# Patient Record
Sex: FEMALE | Race: WHITE | Employment: FULL TIME | URBAN - METROPOLITAN AREA
[De-identification: names, ages, dates, MRNs, and addresses within clinical notes are randomized per-mention and may not be internally consistent; named-entity substitution may affect disease eponyms.]

---

## 2023-02-15 ENCOUNTER — LAB ENCOUNTER (OUTPATIENT)
Dept: LAB | Age: 48
End: 2023-02-15
Attending: NURSE PRACTITIONER
Payer: COMMERCIAL

## 2023-02-15 DIAGNOSIS — E55.9 VITAMIN D DEFICIENCY: ICD-10-CM

## 2023-02-15 LAB — VIT D+METAB SERPL-MCNC: 19.7 NG/ML (ref 30–100)

## 2023-02-15 PROCEDURE — 82306 VITAMIN D 25 HYDROXY: CPT | Performed by: NURSE PRACTITIONER

## 2023-02-15 PROCEDURE — 80053 COMPREHEN METABOLIC PANEL: CPT | Performed by: NURSE PRACTITIONER

## 2023-02-15 PROCEDURE — 85025 COMPLETE CBC W/AUTO DIFF WBC: CPT | Performed by: NURSE PRACTITIONER

## 2023-02-15 PROCEDURE — 83735 ASSAY OF MAGNESIUM: CPT | Performed by: NURSE PRACTITIONER

## 2023-02-16 DIAGNOSIS — E55.9 VITAMIN D DEFICIENCY: Primary | ICD-10-CM

## 2023-02-16 RX ORDER — ERGOCALCIFEROL 1.25 MG/1
50000 CAPSULE ORAL
Qty: 12 CAPSULE | Refills: 0 | Status: SHIPPED | OUTPATIENT
Start: 2023-02-16

## 2023-10-18 ENCOUNTER — LAB ENCOUNTER (OUTPATIENT)
Dept: LAB | Age: 48
End: 2023-10-18
Attending: NURSE PRACTITIONER
Payer: COMMERCIAL

## 2023-10-18 ENCOUNTER — OFFICE VISIT (OUTPATIENT)
Dept: INTERNAL MEDICINE CLINIC | Facility: CLINIC | Age: 48
End: 2023-10-18
Payer: COMMERCIAL

## 2023-10-18 VITALS
BODY MASS INDEX: 20.44 KG/M2 | TEMPERATURE: 98 F | DIASTOLIC BLOOD PRESSURE: 70 MMHG | HEART RATE: 78 BPM | WEIGHT: 138 LBS | HEIGHT: 69 IN | OXYGEN SATURATION: 98 % | SYSTOLIC BLOOD PRESSURE: 110 MMHG | RESPIRATION RATE: 16 BRPM

## 2023-10-18 DIAGNOSIS — Z00.00 ANNUAL PHYSICAL EXAM: Primary | ICD-10-CM

## 2023-10-18 DIAGNOSIS — Z80.3 FH: BREAST CANCER IN FIRST DEGREE RELATIVE WHEN <50 YEARS OLD: ICD-10-CM

## 2023-10-18 DIAGNOSIS — Z12.11 COLON CANCER SCREENING: ICD-10-CM

## 2023-10-18 DIAGNOSIS — Z23 NEED FOR INFLUENZA VACCINATION: ICD-10-CM

## 2023-10-18 DIAGNOSIS — R92.30 DENSE BREAST TISSUE: ICD-10-CM

## 2023-10-18 DIAGNOSIS — E55.9 VITAMIN D DEFICIENCY: ICD-10-CM

## 2023-10-18 DIAGNOSIS — Z00.00 ANNUAL PHYSICAL EXAM: ICD-10-CM

## 2023-10-18 LAB
ALBUMIN SERPL-MCNC: 4.1 G/DL (ref 3.4–5)
ALBUMIN/GLOB SERPL: 1.1 {RATIO} (ref 1–2)
ALP LIVER SERPL-CCNC: 64 U/L
ALT SERPL-CCNC: 16 U/L
ANION GAP SERPL CALC-SCNC: 6 MMOL/L (ref 0–18)
AST SERPL-CCNC: 11 U/L (ref 15–37)
BASOPHILS # BLD AUTO: 0.1 X10(3) UL (ref 0–0.2)
BASOPHILS NFR BLD AUTO: 1.4 %
BILIRUB SERPL-MCNC: 0.8 MG/DL (ref 0.1–2)
BILIRUB UR QL STRIP.AUTO: NEGATIVE
BUN BLD-MCNC: 12 MG/DL (ref 7–18)
CALCIUM BLD-MCNC: 8.9 MG/DL (ref 8.5–10.1)
CHLORIDE SERPL-SCNC: 109 MMOL/L (ref 98–112)
CHOLEST SERPL-MCNC: 171 MG/DL (ref ?–200)
CLARITY UR REFRACT.AUTO: CLEAR
CO2 SERPL-SCNC: 25 MMOL/L (ref 21–32)
COLOR UR AUTO: YELLOW
CREAT BLD-MCNC: 0.91 MG/DL
EGFRCR SERPLBLD CKD-EPI 2021: 79 ML/MIN/1.73M2 (ref 60–?)
EOSINOPHIL # BLD AUTO: 0.45 X10(3) UL (ref 0–0.7)
EOSINOPHIL NFR BLD AUTO: 6.3 %
ERYTHROCYTE [DISTWIDTH] IN BLOOD BY AUTOMATED COUNT: 12.3 %
FASTING PATIENT LIPID ANSWER: YES
FASTING STATUS PATIENT QL REPORTED: YES
GLOBULIN PLAS-MCNC: 3.7 G/DL (ref 2.8–4.4)
GLUCOSE BLD-MCNC: 91 MG/DL (ref 70–99)
GLUCOSE UR STRIP.AUTO-MCNC: NORMAL MG/DL
HCT VFR BLD AUTO: 42.9 %
HDLC SERPL-MCNC: 83 MG/DL (ref 40–59)
HGB BLD-MCNC: 14.2 G/DL
IMM GRANULOCYTES # BLD AUTO: 0.01 X10(3) UL (ref 0–1)
IMM GRANULOCYTES NFR BLD: 0.1 %
KETONES UR STRIP.AUTO-MCNC: NEGATIVE MG/DL
LDLC SERPL CALC-MCNC: 74 MG/DL (ref ?–100)
LEUKOCYTE ESTERASE UR QL STRIP.AUTO: NEGATIVE
LYMPHOCYTES # BLD AUTO: 1.81 X10(3) UL (ref 1–4)
LYMPHOCYTES NFR BLD AUTO: 25.2 %
MCH RBC QN AUTO: 31.8 PG (ref 26–34)
MCHC RBC AUTO-ENTMCNC: 33.1 G/DL (ref 31–37)
MCV RBC AUTO: 96.2 FL
MONOCYTES # BLD AUTO: 0.51 X10(3) UL (ref 0.1–1)
MONOCYTES NFR BLD AUTO: 7.1 %
NEUTROPHILS # BLD AUTO: 4.3 X10 (3) UL (ref 1.5–7.7)
NEUTROPHILS # BLD AUTO: 4.3 X10(3) UL (ref 1.5–7.7)
NEUTROPHILS NFR BLD AUTO: 59.9 %
NITRITE UR QL STRIP.AUTO: NEGATIVE
NONHDLC SERPL-MCNC: 88 MG/DL (ref ?–130)
OSMOLALITY SERPL CALC.SUM OF ELEC: 289 MOSM/KG (ref 275–295)
PH UR STRIP.AUTO: 5.5 [PH] (ref 5–8)
PLATELET # BLD AUTO: 218 10(3)UL (ref 150–450)
POTASSIUM SERPL-SCNC: 4 MMOL/L (ref 3.5–5.1)
PROT SERPL-MCNC: 7.8 G/DL (ref 6.4–8.2)
PROT UR STRIP.AUTO-MCNC: 20 MG/DL
RBC # BLD AUTO: 4.46 X10(6)UL
RBC UR QL AUTO: NEGATIVE
SODIUM SERPL-SCNC: 140 MMOL/L (ref 136–145)
SP GR UR STRIP.AUTO: 1.03 (ref 1–1.03)
TRIGL SERPL-MCNC: 76 MG/DL (ref 30–149)
TSI SER-ACNC: 1.44 MIU/ML (ref 0.36–3.74)
UROBILINOGEN UR STRIP.AUTO-MCNC: NORMAL MG/DL
VIT D+METAB SERPL-MCNC: 38.3 NG/ML (ref 30–100)
VLDLC SERPL CALC-MCNC: 12 MG/DL (ref 0–30)
WBC # BLD AUTO: 7.2 X10(3) UL (ref 4–11)

## 2023-10-18 PROCEDURE — 80050 GENERAL HEALTH PANEL: CPT | Performed by: NURSE PRACTITIONER

## 2023-10-18 PROCEDURE — 99396 PREV VISIT EST AGE 40-64: CPT | Performed by: NURSE PRACTITIONER

## 2023-10-18 PROCEDURE — 3078F DIAST BP <80 MM HG: CPT | Performed by: NURSE PRACTITIONER

## 2023-10-18 PROCEDURE — 90686 IIV4 VACC NO PRSV 0.5 ML IM: CPT | Performed by: NURSE PRACTITIONER

## 2023-10-18 PROCEDURE — 82306 VITAMIN D 25 HYDROXY: CPT | Performed by: NURSE PRACTITIONER

## 2023-10-18 PROCEDURE — 81001 URINALYSIS AUTO W/SCOPE: CPT | Performed by: NURSE PRACTITIONER

## 2023-10-18 PROCEDURE — 80061 LIPID PANEL: CPT | Performed by: NURSE PRACTITIONER

## 2023-10-18 PROCEDURE — 90471 IMMUNIZATION ADMIN: CPT | Performed by: NURSE PRACTITIONER

## 2023-10-18 PROCEDURE — 3074F SYST BP LT 130 MM HG: CPT | Performed by: NURSE PRACTITIONER

## 2023-10-18 PROCEDURE — 3008F BODY MASS INDEX DOCD: CPT | Performed by: NURSE PRACTITIONER

## 2023-11-08 ENCOUNTER — GENETICS ENCOUNTER (OUTPATIENT)
Dept: GENETICS | Facility: HOSPITAL | Age: 48
End: 2023-11-08
Attending: GENETIC COUNSELOR, MS
Payer: COMMERCIAL

## 2023-11-08 ENCOUNTER — NURSE ONLY (OUTPATIENT)
Dept: HEMATOLOGY/ONCOLOGY | Facility: HOSPITAL | Age: 48
End: 2023-11-08
Attending: GENETIC COUNSELOR, MS
Payer: COMMERCIAL

## 2023-11-08 DIAGNOSIS — Z80.0 FAMILY HISTORY OF MALIGNANT NEOPLASM OF GASTROINTESTINAL TRACT: ICD-10-CM

## 2023-11-08 DIAGNOSIS — Z80.41 FAMILY HISTORY OF OVARIAN CANCER: ICD-10-CM

## 2023-11-08 DIAGNOSIS — Z80.9 FAMILY HISTORY OF CANCER: ICD-10-CM

## 2023-11-08 DIAGNOSIS — Z80.3 FAMILY HISTORY OF BREAST CANCER: Primary | ICD-10-CM

## 2023-11-08 PROCEDURE — 96040 HC GENETIC COUNSELING EA 30 MIN: CPT | Performed by: GENETIC COUNSELOR, MS

## 2023-11-08 PROCEDURE — 36415 COLL VENOUS BLD VENIPUNCTURE: CPT

## 2023-11-28 ENCOUNTER — GENETICS ENCOUNTER (OUTPATIENT)
Dept: HEMATOLOGY/ONCOLOGY | Facility: HOSPITAL | Age: 48
End: 2023-11-28

## 2023-11-28 DIAGNOSIS — Z13.71 BRCA GENE MUTATION NEGATIVE IN FEMALE: Primary | ICD-10-CM

## 2023-11-28 NOTE — PROGRESS NOTES
Patient Name: Jennifer Catalan  YOB: 1975    Referring Provider: JUVENAL Bauman  Additional Provider(s): Dayana Gonsales MD     Reason for Referral:  Ms. Tunde Duke had genetic testing performed on 11/8/2023 because of a family history of breast and other cancers. Genetic Testing Result:  Negative. No pathogenic variant was found in the following 70 genes on the [employer] BRCA1/2 panel and multi-cancer + RNA panel: AIP, ALK, APC*, AKIRA*, AXIN2, BAP1, BARD1, BLM, BMPR1A, BRCA1, BRCA2, BRIP1, CDC73, CDH1, CDK4, CDKN1B, CDKN2A (p14ARF), CDKN2A (b93WPA1g), CHEK2, CTNNA1, DICER1*, EGFR, EPCAM*, FH*, FLCN, GREM1*, HOXB13, KIT, LZTR1, MAX*, MBD4, MEN1*, MET*, MITF, MLH1*, MSH2*, MSH3*, MSH6*, MUTYH, NF1*, NF2, NTHL1, PALB2, PDGFRA, PMS2*, POLD1*, POLE, POT1, JZQJD7X, PTCH1, PTEN*, RAD51C, RAD51D, RB1*, RET, SDHA*, SDHAF2, SDHB, SDHC*, SDHD, SMAD4, SMARCA4, SMARCB1, SMARCE1, STK11, SUFU, DLRV522, TP53, TSC1*, TSC2, VHL. Please refer to the report from [facility] for additional testing information. These results were discussed with Ms. Willis via telephone on 11/28/2023. Summary and Plan:   These results indicate it is unlikely that Ms. Willis has a pathogenic variant (harmful genetic mutation) in any of the genes listed above. No pathogenic variants associated with hereditary cancer syndromes were identified. The etiology Ms. Willis's family history of cancer remains genetically unexplained. Medical management and surveillance for Ms. Willis and other family members should be based on their personal and family history. All medical management decisions should be made with a physician. The limitations of the testing were discussed with Ms. Willis including the chance that a pathogenic variant in a gene other than those included in this analysis might be the cause of cancer in Ms. Willis or in relatives.      Presently, there are multiple models available to calculate risks for an individual to develop breast cancer over their lifetime. Each model takes into consideration different factors. Based on the personal health and pedigree information provided at this point in time, Ms. Willis would be assessed a lifetime risk of 34.6% (Tyrer-Cuzick HOLLIE V.8.b) to develop breast cancer (compared to a 10% lifetime risk for the average woman). [employer] and the [address] recommends high-risk individuals with a lifetime breast cancer risk >20% receive annual screening breast MRI in addition to annual screening mammography, regardless of breast density. If the patient is unable to receive MRI, then supplemental screening with ultrasound or MBI in addition to routine mammography may be appropriate. It is important to note that the estimation of breast cancer risk changes over time. It may increase or decrease over time based on age and changes in the personal and/or family history. I encouraged Ms. Willis to share the genetic test results with her relatives so that they may discuss the implications of this information with their health providers. Ms. Sherley Hahn is also encouraged to contact me on an annual basis to learn if there have been any updates in genetic testing that would apply or if there are changes in the personal and/or family history. Please do not hesitate to contact my office if you have any questions or concerns, [phone].      Jose Maria Woodruff, MS, CGC

## 2024-04-12 PROBLEM — Z80.0 FAMILY HISTORY OF COLON CANCER: Status: ACTIVE | Noted: 2024-04-12

## 2024-04-12 PROBLEM — Z12.11 SPECIAL SCREENING FOR MALIGNANT NEOPLASM OF COLON: Status: ACTIVE | Noted: 2024-04-12

## 2024-04-12 PROBLEM — Z83.719 FAMILY HISTORY OF COLONIC POLYPS: Status: ACTIVE | Noted: 2024-04-12

## 2024-04-12 PROBLEM — D12.4 BENIGN NEOPLASM OF DESCENDING COLON: Status: ACTIVE | Noted: 2024-04-12

## 2024-10-23 ENCOUNTER — OFFICE VISIT (OUTPATIENT)
Dept: INTERNAL MEDICINE CLINIC | Facility: CLINIC | Age: 49
End: 2024-10-23
Payer: COMMERCIAL

## 2024-10-23 ENCOUNTER — LAB ENCOUNTER (OUTPATIENT)
Dept: LAB | Age: 49
End: 2024-10-23
Attending: NURSE PRACTITIONER
Payer: COMMERCIAL

## 2024-10-23 VITALS
BODY MASS INDEX: 20.73 KG/M2 | DIASTOLIC BLOOD PRESSURE: 68 MMHG | TEMPERATURE: 98 F | WEIGHT: 140 LBS | HEART RATE: 78 BPM | SYSTOLIC BLOOD PRESSURE: 116 MMHG | OXYGEN SATURATION: 98 % | HEIGHT: 69 IN | RESPIRATION RATE: 14 BRPM

## 2024-10-23 DIAGNOSIS — Z00.00 ANNUAL PHYSICAL EXAM: ICD-10-CM

## 2024-10-23 DIAGNOSIS — R92.30 DENSE BREAST TISSUE: ICD-10-CM

## 2024-10-23 DIAGNOSIS — Z23 NEED FOR DIPHTHERIA-TETANUS-PERTUSSIS (TDAP) VACCINE: ICD-10-CM

## 2024-10-23 DIAGNOSIS — E55.9 VITAMIN D DEFICIENCY: ICD-10-CM

## 2024-10-23 DIAGNOSIS — Z91.89 AT HIGH RISK FOR BREAST CANCER: ICD-10-CM

## 2024-10-23 DIAGNOSIS — Z12.31 ENCOUNTER FOR SCREENING MAMMOGRAM FOR MALIGNANT NEOPLASM OF BREAST: ICD-10-CM

## 2024-10-23 DIAGNOSIS — Z00.00 ANNUAL PHYSICAL EXAM: Primary | ICD-10-CM

## 2024-10-23 DIAGNOSIS — N95.1 PERIMENOPAUSAL SYMPTOMS: ICD-10-CM

## 2024-10-23 LAB
ALBUMIN SERPL-MCNC: 4.8 G/DL (ref 3.2–4.8)
ALBUMIN/GLOB SERPL: 1.7 {RATIO} (ref 1–2)
ALP LIVER SERPL-CCNC: 69 U/L
ALT SERPL-CCNC: 13 U/L
ANION GAP SERPL CALC-SCNC: 8 MMOL/L (ref 0–18)
AST SERPL-CCNC: 19 U/L (ref ?–34)
BASOPHILS # BLD AUTO: 0.07 X10(3) UL (ref 0–0.2)
BASOPHILS NFR BLD AUTO: 1 %
BILIRUB SERPL-MCNC: 0.7 MG/DL (ref 0.3–1.2)
BILIRUB UR QL STRIP.AUTO: NEGATIVE
BUN BLD-MCNC: 21 MG/DL (ref 9–23)
CALCIUM BLD-MCNC: 9.6 MG/DL (ref 8.7–10.4)
CHLORIDE SERPL-SCNC: 105 MMOL/L (ref 98–112)
CHOLEST SERPL-MCNC: 176 MG/DL (ref ?–200)
CLARITY UR REFRACT.AUTO: CLEAR
CO2 SERPL-SCNC: 26 MMOL/L (ref 21–32)
COLOR UR AUTO: YELLOW
CREAT BLD-MCNC: 0.85 MG/DL
EGFRCR SERPLBLD CKD-EPI 2021: 85 ML/MIN/1.73M2 (ref 60–?)
EOSINOPHIL # BLD AUTO: 0.24 X10(3) UL (ref 0–0.7)
EOSINOPHIL NFR BLD AUTO: 3.4 %
ERYTHROCYTE [DISTWIDTH] IN BLOOD BY AUTOMATED COUNT: 12.7 %
EST. AVERAGE GLUCOSE BLD GHB EST-MCNC: 105 MG/DL (ref 68–126)
FASTING PATIENT LIPID ANSWER: YES
FASTING STATUS PATIENT QL REPORTED: YES
GLOBULIN PLAS-MCNC: 2.9 G/DL (ref 2–3.5)
GLUCOSE BLD-MCNC: 93 MG/DL (ref 70–99)
GLUCOSE UR STRIP.AUTO-MCNC: NORMAL MG/DL
HBA1C MFR BLD: 5.3 % (ref ?–5.7)
HCT VFR BLD AUTO: 41.4 %
HDLC SERPL-MCNC: 81 MG/DL (ref 40–59)
HGB BLD-MCNC: 13.8 G/DL
IMM GRANULOCYTES # BLD AUTO: 0.02 X10(3) UL (ref 0–1)
IMM GRANULOCYTES NFR BLD: 0.3 %
KETONES UR STRIP.AUTO-MCNC: NEGATIVE MG/DL
LDLC SERPL CALC-MCNC: 84 MG/DL (ref ?–100)
LEUKOCYTE ESTERASE UR QL STRIP.AUTO: NEGATIVE
LYMPHOCYTES # BLD AUTO: 1.52 X10(3) UL (ref 1–4)
LYMPHOCYTES NFR BLD AUTO: 21.5 %
MCH RBC QN AUTO: 31.3 PG (ref 26–34)
MCHC RBC AUTO-ENTMCNC: 33.3 G/DL (ref 31–37)
MCV RBC AUTO: 93.9 FL
MONOCYTES # BLD AUTO: 0.45 X10(3) UL (ref 0.1–1)
MONOCYTES NFR BLD AUTO: 6.4 %
NEUTROPHILS # BLD AUTO: 4.77 X10 (3) UL (ref 1.5–7.7)
NEUTROPHILS # BLD AUTO: 4.77 X10(3) UL (ref 1.5–7.7)
NEUTROPHILS NFR BLD AUTO: 67.4 %
NITRITE UR QL STRIP.AUTO: NEGATIVE
NONHDLC SERPL-MCNC: 95 MG/DL (ref ?–130)
OSMOLALITY SERPL CALC.SUM OF ELEC: 291 MOSM/KG (ref 275–295)
PH UR STRIP.AUTO: 5.5 [PH] (ref 5–8)
PLATELET # BLD AUTO: 272 10(3)UL (ref 150–450)
POTASSIUM SERPL-SCNC: 4.1 MMOL/L (ref 3.5–5.1)
PROT SERPL-MCNC: 7.7 G/DL (ref 5.7–8.2)
PROT UR STRIP.AUTO-MCNC: NEGATIVE MG/DL
RBC # BLD AUTO: 4.41 X10(6)UL
RBC UR QL AUTO: NEGATIVE
SODIUM SERPL-SCNC: 139 MMOL/L (ref 136–145)
SP GR UR STRIP.AUTO: 1.03 (ref 1–1.03)
TRIGL SERPL-MCNC: 54 MG/DL (ref 30–149)
TSI SER-ACNC: 1.56 MIU/ML (ref 0.55–4.78)
UROBILINOGEN UR STRIP.AUTO-MCNC: NORMAL MG/DL
VIT D+METAB SERPL-MCNC: 32.9 NG/ML (ref 30–100)
VLDLC SERPL CALC-MCNC: 9 MG/DL (ref 0–30)
WBC # BLD AUTO: 7.1 X10(3) UL (ref 4–11)

## 2024-10-23 PROCEDURE — 84443 ASSAY THYROID STIM HORMONE: CPT | Performed by: NURSE PRACTITIONER

## 2024-10-23 PROCEDURE — 82306 VITAMIN D 25 HYDROXY: CPT

## 2024-10-23 PROCEDURE — 81003 URINALYSIS AUTO W/O SCOPE: CPT | Performed by: NURSE PRACTITIONER

## 2024-10-23 PROCEDURE — 80061 LIPID PANEL: CPT | Performed by: NURSE PRACTITIONER

## 2024-10-23 PROCEDURE — 80053 COMPREHEN METABOLIC PANEL: CPT | Performed by: NURSE PRACTITIONER

## 2024-10-23 PROCEDURE — 83036 HEMOGLOBIN GLYCOSYLATED A1C: CPT | Performed by: NURSE PRACTITIONER

## 2024-10-23 PROCEDURE — 85025 COMPLETE CBC W/AUTO DIFF WBC: CPT | Performed by: NURSE PRACTITIONER

## 2024-10-23 NOTE — PROGRESS NOTES
Patient was seen and examined by me as well as APN student. Agree with assessment and plan.   GILES Kramer

## 2024-10-23 NOTE — PROGRESS NOTES
Subjective:   Dianna Willis is a 48 year old female who presents for Annual physical.     Complaints of perimenopausal symptoms including waking up nightly at 4 AM, abdominal weight gain, intermittent hot flashes. Tracking menstrual periods, missed several months of period this year. Family hx of breast cancer in mother and sister. She is seeing high risk for breast management.     Exercise: weight lifting, cardio  Diet: clean eating    Screenings  Pap 11/2023 by OB, GYN, negative.   Colonoscopy 4/2024, one adenoma, follow up in 7 years.   Mammogram: 12/2023, dense breast tissue.   DEXA: NA     History/Other:    No Further Nursing Notes to Review  Tobacco Reviewed  Allergies   Reviewed  Medications Reviewed  Problem List Reviewed  Medical History   Reviewed  Surgical History Reviewed  OB Status Reviewed  Family History   Reviewed         Tobacco:  She has never smoked tobacco.    Current Outpatient Medications   Medication Sig Dispense Refill    Cholecalciferol (VITAMIN D3) 25 MCG (1000 UT) Oral Cap Take 1 tablet by mouth daily.      ALPRAZolam (XANAX) 0.25 MG Oral Tab Take 1 tablet (0.25 mg total) by mouth 2 (two) times daily as needed for Anxiety. Take half tablet to 1 tablet as needed for anxiety symptoms (Patient not taking: Reported on 10/23/2024) 10 tablet 0         Review of Systems:  Review of Systems   All other systems reviewed and are negative.      Objective:   /68   Pulse 78   Temp 98.1 °F (36.7 °C)   Resp 14   Ht 5' 9\" (1.753 m)   Wt 140 lb (63.5 kg)   LMP 09/04/2024 (Approximate)   SpO2 98%   BMI 20.67 kg/m²  Estimated body mass index is 20.67 kg/m² as calculated from the following:    Height as of this encounter: 5' 9\" (1.753 m).    Weight as of this encounter: 140 lb (63.5 kg).  Physical Exam  Vitals and nursing note reviewed.   Constitutional:       General: She is not in acute distress.     Appearance: Normal appearance.   HENT:      Right Ear: Tympanic membrane, ear  canal and external ear normal. There is no impacted cerumen.      Left Ear: Tympanic membrane, ear canal and external ear normal. There is no impacted cerumen.      Mouth/Throat:      Pharynx: No posterior oropharyngeal erythema.   Cardiovascular:      Rate and Rhythm: Normal rate and regular rhythm.      Pulses: Normal pulses.      Heart sounds: Normal heart sounds. No murmur heard.  Pulmonary:      Effort: Pulmonary effort is normal. No respiratory distress.      Breath sounds: Normal breath sounds.   Neurological:      Mental Status: She is oriented to person, place, and time.   Psychiatric:         Behavior: Behavior normal.       Assessment & Plan:   1. Annual physical exam (Primary)  -     CBC With Differential With Platelet  -     Comp Metabolic Panel (14)  -     Lipid Panel  -     TSH W Reflex To Free T4  -     Vitamin D; Future; Expected date: 10/23/2024  -     Urinalysis, Routine  -     Hemoglobin A1C    2. Encounter for screening mammogram for malignant neoplasm of breast  -     [facility] JOAO 2D+3D SCREENING BILAT (CPT=77067/34427); Future; Expected date: 10/23/2024    3. Dense breast tissue  -     US BREAST BILATERAL COMPLETE (CPT=76641-50); Future; Expected date: 10/23/2024    4. Perimenopausal symptoms  -discussed estroven OTC trial  -continue weight bearing to prevent bone density loss   -discourage use of hormone replacement therapy due to family hx of breast cancer, patient to discuss with genetics and GYN if further interested.   -Mychart message if interested in trial of nonhormonal medications for perimenopausal symptoms.    5. Need for diphtheria-tetanus-pertussis (Tdap) vaccine  Other orders  -     TdaP (Adacel, Boostrix) [27717]        Return in about 1 year (around 10/23/2025) for Annual physical.    Gloria Marino, 10/23/2024, 9:14 AM

## 2025-01-16 DIAGNOSIS — F41.9 ANXIETY: ICD-10-CM

## 2025-01-16 RX ORDER — ALPRAZOLAM 0.25 MG/1
0.25 TABLET ORAL 2 TIMES DAILY PRN
Qty: 10 TABLET | Refills: 0 | Status: SHIPPED | OUTPATIENT
Start: 2025-01-16

## 2025-01-16 NOTE — TELEPHONE ENCOUNTER
Last time medication was refilled 02/15/2023  Last office visit  10/23/2024  Next office visit due/scheduled   Future Appointments   Date Time Provider Department Center   1/22/2025  3:00 PM Ember Pitts APRN [facility] [address]       Medication not on protocol.

## 2025-01-22 ENCOUNTER — TELEMEDICINE (OUTPATIENT)
Dept: INTERNAL MEDICINE CLINIC | Facility: CLINIC | Age: 50
End: 2025-01-22
Payer: COMMERCIAL

## 2025-01-22 DIAGNOSIS — R42 DIZZINESS: Primary | ICD-10-CM

## 2025-01-22 PROBLEM — Z83.719 FAMILY HISTORY OF COLONIC POLYPS: Status: RESOLVED | Noted: 2024-04-12 | Resolved: 2025-01-22

## 2025-01-22 PROBLEM — Z12.11 SPECIAL SCREENING FOR MALIGNANT NEOPLASM OF COLON: Status: RESOLVED | Noted: 2024-04-12 | Resolved: 2025-01-22

## 2025-01-22 PROBLEM — D12.4 BENIGN NEOPLASM OF DESCENDING COLON: Status: RESOLVED | Noted: 2024-04-12 | Resolved: 2025-01-22

## 2025-01-22 NOTE — PROGRESS NOTES
HPI:    Patient ID: Dianna Willis is a 49 year old female.    Chief Complaint   Patient presents with    Lightheadedness     This visit is conducted using Telemedicine with live, interactive video and audio.    Feeling lightheadedness, unsteady, like she is going to pass out for standing in same position for 5 minutes are longer. She has to sit down with back support bc she feels she will fall off stool at work. She works in dialysis center. She eats yogurt parfait almost every morning. This occurs mostly at work but has noted more frequently now other places. She does report some stress but this was occurring before stress triggers. No history of vertigo. No recent illness. No sinus, ear, throat congestion. She has checked vitals and they are stable. She is drinking 60+ oz water daily. She did add electrolyte supplement daily for last 2 weeks or so with no change in symptoms.         Review of Systems   All other systems reviewed and are negative.        Past Medical History:    BRCA gene mutation negative in female    Negative 70 gene hereditary cancer panel, report in media tab    Menses painful    Since age 13 or 14     History reviewed. No pertinent surgical history.  Family History   Problem Relation Age of Onset    Colon Polyps Father     Blood Cancer Father 68        MGUS/Multiple myeloma    Breast Cancer Mother 65    Blood Cancer Mother 63        CLL    Cancer Maternal Grandfather         lung cancer (dx 70s) and stomach cancer (dx 60s)    Stroke Maternal Grandfather     Colon Cancer Paternal Grandmother         possible colon cancer, dx 50s    Diabetes Paternal Grandmother     Cancer Paternal Grandfather         lung cancer dx 70-80s    Breast Cancer Sister 40        negative genetic testing 2020    Ovarian Cancer Maternal Great-Grandmother     Breast Cancer Sister      Social History     Socioeconomic History    Marital status: Single   Tobacco Use    Smoking status: Never    Smokeless tobacco: Never    Vaping Use    Vaping status: Never Used   Substance and Sexual Activity    Alcohol use: Yes     Alcohol/week: 2.0 standard drinks of alcohol     Types: 2 Glasses of wine per week     Comment: Sometimes go weeks without any    Drug use: Never          Current Outpatient Medications   Medication Sig Dispense Refill    ALPRAZolam (XANAX) 0.25 MG Oral Tab Take 1 tablet (0.25 mg total) by mouth 2 (two) times daily as needed for Anxiety. Take half tablet to 1 tablet as needed for anxiety symptoms 10 tablet 0    Cholecalciferol (VITAMIN D3) 25 MCG (1000 UT) Oral Cap Take 1 tablet by mouth daily.       Allergies:Allergies[1]    Lab Results   Component Value Date    GLU 93 10/23/2024    BUN 21 10/23/2024    CREATSERUM 0.85 10/23/2024    ANIONGAP 8 10/23/2024    CA 9.6 10/23/2024    OSMOCALC 291 10/23/2024    ALKPHO 69 10/23/2024    AST 19 10/23/2024    ALT 13 10/23/2024    BILT 0.7 10/23/2024    TP 7.7 10/23/2024    ALB 4.8 10/23/2024    GLOBULIN 2.9 10/23/2024     10/23/2024    K 4.1 10/23/2024     10/23/2024    CO2 26.0 10/23/2024     Lab Results   Component Value Date    WBC 7.1 10/23/2024    RBC 4.41 10/23/2024    HGB 13.8 10/23/2024    HCT 41.4 10/23/2024    MCV 93.9 10/23/2024    MCH 31.3 10/23/2024    MCHC 33.3 10/23/2024    RDW 12.7 10/23/2024    .0 10/23/2024     Lab Results   Component Value Date    CHOLEST 176 10/23/2024    TRIG 54 10/23/2024    HDL 81 (H) 10/23/2024    LDL 84 10/23/2024    VLDL 9 10/23/2024    NONHDLC 95 10/23/2024     Lab Results   Component Value Date     10/23/2024    A1C 5.3 10/23/2024     Lab Results   Component Value Date    TSH 1.558 10/23/2024     Lab Results   Component Value Date    VITD 32.9 10/23/2024     Lab Results   Component Value Date    MG 2.0 02/15/2023        PHYSICAL EXAM:   - well appearing via video visit   - no respiratory distress  - able to speak in full sentences  - alert and oriented          ASSESSMENT/PLAN:   Diagnoses and all orders for  this visit:    Dizziness  -     Ferritin [E]  -     Iron And Tibc [E]  -     Comp Metabolic Panel (14) [E]  -     Insulin [E]  -     CBC With Differential With Platelet    Check labs, if negative. Refer to PT to rule out BPPV         JUVENAL Dutta              [1]   Allergies  Allergen Reactions    Benzoyl Peroxide SWELLING

## 2025-01-24 LAB
% SATURATION: 14 % (CALC) (ref 16–45)
ABSOLUTE BASOPHILS: 88 CELLS/UL (ref 0–200)
ABSOLUTE EOSINOPHILS: 548 CELLS/UL (ref 15–500)
ABSOLUTE LYMPHOCYTES: 1821 CELLS/UL (ref 850–3900)
ABSOLUTE MONOCYTES: 441 CELLS/UL (ref 200–950)
ABSOLUTE NEUTROPHILS: 3402 CELLS/UL (ref 1500–7800)
ALBUMIN/GLOBULIN RATIO: 1.6 (CALC) (ref 1–2.5)
ALBUMIN: 4.1 G/DL (ref 3.6–5.1)
ALKALINE PHOSPHATASE: 65 U/L (ref 31–125)
ALT: 13 U/L (ref 6–29)
AST: 14 U/L (ref 10–35)
BASOPHILS: 1.4 %
BILIRUBIN, TOTAL: 0.3 MG/DL (ref 0.2–1.2)
BUN: 14 MG/DL (ref 7–25)
CALCIUM: 9 MG/DL (ref 8.6–10.2)
CARBON DIOXIDE: 28 MMOL/L (ref 20–32)
CHLORIDE: 106 MMOL/L (ref 98–110)
CREATININE: 0.73 MG/DL (ref 0.5–0.99)
EGFR: 101 ML/MIN/1.73M2
EOSINOPHILS: 8.7 %
FERRITIN: 29 NG/ML (ref 16–232)
GLOBULIN: 2.5 G/DL (CALC) (ref 1.9–3.7)
GLUCOSE: 86 MG/DL (ref 65–99)
HEMATOCRIT: 40.8 % (ref 35–45)
HEMOGLOBIN: 13.1 G/DL (ref 11.7–15.5)
INSULIN: 5.5 UIU/ML
IRON BINDING CAPACITY: 286 MCG/DL (CALC) (ref 250–450)
IRON, TOTAL: 41 MCG/DL (ref 40–190)
LYMPHOCYTES: 28.9 %
MAGNESIUM: 2.1 MG/DL (ref 1.5–2.5)
MCH: 31 PG (ref 27–33)
MCHC: 32.1 G/DL (ref 32–36)
MCV: 96.5 FL (ref 80–100)
MONOCYTES: 7 %
MPV: 10.9 FL (ref 7.5–12.5)
NEUTROPHILS: 54 %
PLATELET COUNT: 316 THOUSAND/UL (ref 140–400)
POTASSIUM: 4.5 MMOL/L (ref 3.5–5.3)
PROTEIN, TOTAL: 6.6 G/DL (ref 6.1–8.1)
RDW: 11.9 % (ref 11–15)
RED BLOOD CELL COUNT: 4.23 MILLION/UL (ref 3.8–5.1)
SODIUM: 142 MMOL/L (ref 135–146)
WHITE BLOOD CELL COUNT: 6.3 THOUSAND/UL (ref 3.8–10.8)